# Patient Record
Sex: MALE | Race: WHITE | NOT HISPANIC OR LATINO | URBAN - METROPOLITAN AREA
[De-identification: names, ages, dates, MRNs, and addresses within clinical notes are randomized per-mention and may not be internally consistent; named-entity substitution may affect disease eponyms.]

---
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
## 2023-02-20 ENCOUNTER — APPOINTMENT (RX ONLY)
Dept: URBAN - METROPOLITAN AREA CLINIC 23 | Facility: CLINIC | Age: 18
Setting detail: DERMATOLOGY
End: 2023-02-20

## 2023-02-20 DIAGNOSIS — D22 MELANOCYTIC NEVI: ICD-10-CM

## 2023-02-20 DIAGNOSIS — L21.8 OTHER SEBORRHEIC DERMATITIS: ICD-10-CM | Status: INADEQUATELY CONTROLLED

## 2023-02-20 PROBLEM — D22.39 MELANOCYTIC NEVI OF OTHER PARTS OF FACE: Status: ACTIVE | Noted: 2023-02-20

## 2023-02-20 PROCEDURE — ? PRESCRIPTION MEDICATION MANAGEMENT

## 2023-02-20 PROCEDURE — ? COUNSELING

## 2023-02-20 PROCEDURE — 99204 OFFICE O/P NEW MOD 45 MIN: CPT

## 2023-02-20 PROCEDURE — ? PRESCRIPTION

## 2023-02-20 RX ORDER — KETOCONAZOLE 20 MG/G
1 CREAM TOPICAL
Qty: 60 | Refills: 3 | Status: ERX | COMMUNITY
Start: 2023-02-20

## 2023-02-20 RX ORDER — CLOBETASOL PROPIONATE 0.5 MG/ML
1 SOLUTION TOPICAL QHS
Qty: 50 | Refills: 2 | Status: ERX | COMMUNITY
Start: 2023-02-20

## 2023-02-20 RX ORDER — KETOCONAZOLE 20 MG/ML
SHAMPOO, SUSPENSION TOPICAL
Qty: 120 | Refills: 5 | Status: ERX | COMMUNITY
Start: 2023-02-20

## 2023-02-20 RX ADMIN — KETOCONAZOLE 1: 20 CREAM TOPICAL at 00:00

## 2023-02-20 RX ADMIN — KETOCONAZOLE: 20 SHAMPOO, SUSPENSION TOPICAL at 00:00

## 2023-02-20 RX ADMIN — CLOBETASOL PROPIONATE 1: 0.5 SOLUTION TOPICAL at 00:00

## 2023-02-20 ASSESSMENT — LOCATION SIMPLE DESCRIPTION DERM
LOCATION SIMPLE: SCALP
LOCATION SIMPLE: RIGHT SUBMANDIBULAR AREA

## 2023-02-20 ASSESSMENT — LOCATION ZONE DERM
LOCATION ZONE: SCALP
LOCATION ZONE: FACE

## 2023-02-20 ASSESSMENT — LOCATION DETAILED DESCRIPTION DERM
LOCATION DETAILED: RIGHT SUPERIOR PARIETAL SCALP
LOCATION DETAILED: RIGHT SUBMANDIBULAR AREA

## 2023-02-20 NOTE — HPI: RASH
What Type Of Note Output Would You Prefer (Optional)?: Standard Output
Is The Patient Presenting As Previously Scheduled?: Yes
Is This A New Presentation, Or A Follow-Up?: Rash
Additional History: Pt has tried OTC dandruff shampoos without improvement.

## 2023-02-20 NOTE — PROCEDURE: PRESCRIPTION MEDICATION MANAGEMENT
Render In Strict Bullet Format?: No
Initiate Treatment: ketoconazole 2 % shampoo: Wash scalp 4-5 times weekly. Leave on for 5 minutes then rinse off. Once clear, can use 1-2 times weekly for maintenance.\\n\\nclobetasol 0.05 % scalp solution: Apply to scalp QHS x 1-2 weeks until clear. May repeat PRN flares.\\n\\nketoconazole 2 % topical cream: Apply twice daily to affected areas behind ears until clear, then d/c. Restart bid prn flares
Detail Level: Zone

## 2023-07-14 ENCOUNTER — OFFICE VISIT (OUTPATIENT)
Dept: PRIMARY CARE | Facility: CLINIC | Age: 18
End: 2023-07-14
Payer: COMMERCIAL

## 2023-07-14 VITALS
RESPIRATION RATE: 15 BRPM | BODY MASS INDEX: 20.02 KG/M2 | SYSTOLIC BLOOD PRESSURE: 124 MMHG | DIASTOLIC BLOOD PRESSURE: 74 MMHG | HEART RATE: 85 BPM | WEIGHT: 143 LBS | TEMPERATURE: 97.7 F | OXYGEN SATURATION: 98 % | HEIGHT: 71 IN

## 2023-07-14 DIAGNOSIS — Z00.00 ROUTINE GENERAL MEDICAL EXAMINATION AT A HEALTH CARE FACILITY: Primary | ICD-10-CM

## 2023-07-14 DIAGNOSIS — Z13.220 LIPID SCREENING: ICD-10-CM

## 2023-07-14 PROCEDURE — 3008F BODY MASS INDEX DOCD: CPT | Performed by: STUDENT IN AN ORGANIZED HEALTH CARE EDUCATION/TRAINING PROGRAM

## 2023-07-14 PROCEDURE — 99395 PREV VISIT EST AGE 18-39: CPT | Performed by: STUDENT IN AN ORGANIZED HEALTH CARE EDUCATION/TRAINING PROGRAM

## 2023-07-14 ASSESSMENT — PATIENT HEALTH QUESTIONNAIRE - PHQ9: SUM OF ALL RESPONSES TO PHQ9 QUESTIONS 1 & 2: 0

## 2023-07-14 ASSESSMENT — PAIN SCALES - GENERAL: PAINLEVEL: 0-NO PAIN

## 2023-07-14 NOTE — ASSESSMENT & PLAN NOTE
Patient is up-to-date with age-appropriate cancer screenings and vaccinations unless otherwise noted above.  Counseled regarding importance of keeping up-to-date with vaccinations and cancer screenings as applicable.  Baseline/repeat/annual labs ordered.  RTO 1 year or sooner PRN.

## 2023-07-14 NOTE — PROGRESS NOTES
"     [facility]     CHIEF COMPLAINT   Annual Physical Exam     HISTORY OF PRESENT ILLNESS      This is a 17 y.o. male who presents for an annual physical exam.  Patient states since last visit he has lost approximately 40 pounds through caloric restriction and exercise intentionally.  Denies complaints.    PAST MEDICAL AND SURGICAL HISTORY      History reviewed. No pertinent past medical history.    History reviewed. No pertinent surgical history.    MEDICATIONS      No current outpatient medications on file.    ALLERGIES      Patient has no known allergies.    FAMILY HISTORY      Family History   Problem Relation Age of Onset   • No Known Problems Biological Mother    • No Known Problems Biological Father         Hip replacement       SOCIAL HISTORY      Social History     Socioeconomic History   • Marital status: Single     Spouse name: None   • Number of children: None   • Years of education: None   • Highest education level: None   Occupational History   • Occupation: Unemployed   Tobacco Use   • Smoking status: Never   • Smokeless tobacco: Never   Vaping Use   • Vaping Use: Never used   Substance and Sexual Activity   • Alcohol use: Never   • Drug use: Never   • Sexual activity: Defer       REVIEW OF SYSTEMS      Review of Systems   All other systems reviewed and are negative.      PHYSICAL EXAMINATION      Visit Vitals  /74   Pulse 85   Temp 36.5 °C (97.7 °F) (Temporal)   Resp 15   Ht 1.803 m (5' 11\")   Wt 64.9 kg (143 lb) Comment: with sneakers on   SpO2 98%   BMI 19.94 kg/m²     Body mass index is 19.94 kg/m².    Physical Exam  Constitutional:       General: He is not in acute distress.     Appearance: He is well-developed. He is not toxic-appearing or diaphoretic.   HENT:      Head: Normocephalic and atraumatic.   Eyes:      Extraocular Movements: Extraocular movements intact.      Pupils: Pupils are equal, round, and reactive to light.   Cardiovascular:      Rate and Rhythm: Normal " rate and regular rhythm.      Heart sounds: Normal heart sounds. No murmur heard.     No friction rub. No gallop.   Pulmonary:      Effort: Pulmonary effort is normal. No respiratory distress.      Breath sounds: Normal breath sounds. No wheezing or rales.   Musculoskeletal:         General: No deformity. Normal range of motion.      Cervical back: Normal range of motion and neck supple.      Right lower leg: No edema.      Left lower leg: No edema.   Skin:     General: Skin is warm and dry.      Coloration: Skin is not jaundiced.      Findings: No rash.   Neurological:      General: No focal deficit present.      Mental Status: He is alert and oriented to person, place, and time.   Psychiatric:         Mood and Affect: Mood normal.         Behavior: Behavior normal.         LABS / IMAGING / STUDIES        Labs    No results found for: CREATININE  No results found for: WBC, HGB, HCT, MCV, PLT      No results found for: HGBA1C  No results found for: RACHEAL [id number]        HEALTH MAINTENANCE           Tdap: Believes up-to-date  COVID-19: Up-to-date    Diet: Balanced  Exercise: Regular       ASSESSMENT AND PLAN   Problem List Items Addressed This Visit        Other    Routine general medical examination at a health care facility - Primary     Patient is up-to-date with age-appropriate cancer screenings and vaccinations unless otherwise noted above.  Counseled regarding importance of keeping up-to-date with vaccinations and cancer screenings as applicable.  Baseline/repeat/annual labs ordered.  RTO 1 year or sooner PRN.           Relevant Orders    CBC and differential    Comprehensive metabolic panel    Lipid panel   Other Visit Diagnoses     Lipid screening        Relevant Orders    Lipid panel          Health Care Maintenance (discussed if applicable):    Patient encouraged to increase activity gradually as tolerated with a goal of 150 minutes of aerobic activity per week via AVS.     Counseled patient on  healthy eating (high quality, low carb, low sugar diet) via AVS.     Advised patient to complete regular dental examinations, brushing and flossing daily via AVS.    Encouraged enhanced safety by wearing seat belts, checking smoke and CO detectors via AVS.            Clifton Ayala, DO  07/14/23        Some or all of this note has been written using voice recognition software.    Please excuse any typographical errors.    On date of encounter, 30 minutes were spent exclusively dedicated to this encounter including pre-visit chart review and documentation, patient interview and physical examination, and post-visit documentation.

## 2023-07-14 NOTE — PATIENT INSTRUCTIONS
After-Visit Instructions and Tips for General Wellbeing      -Let us know over our Patient Portal ([employer]) if you have any further questions or concerns.         At your visit today, we determined the time for you to come back for your next visit.  This was based off of your medical history, any medical problems/symptoms discussed at time of visit, and any testing ordered.         If at any point between now and your next visit you to have any questions or concerns, please feel free to reach out to me via our Patient Portal, [employer].  [employer] is free to use and I typically get back to you same-day, Monday through Friday.  Sometimes we are able to handle small questions or concerns over [employer], however if we cannot safely do so I will recommend that you come in for an appointment or get set up with a telemedicine appointment.      -Don't forget to get your blood work done.         Blood work is typically performed either through [facility], [facility], or [facility] laboratories.  This is dependent on your insurance, and during your visit we have discussed which lab is covered for you.  It will additionally state what lab you can go to on the hard copy of the lab slip that was provided to you after your visit.            Unless stated otherwise, these labs are FASTING.  You should not have anything to eat or drink besides water or black coffee (no cream or sugar) for at least 8 hours prior to getting your blood drawn.  Because of this, I usually recommend getting your blood work done first thing in the morning, as you have been fasting overnight.  Many people have the same idea however and these labs can get quite busy in the morning.         I recommend going online and setting up an appointment with the lab for an early morning just so that specific time spot is guaranteed for you and you are not waiting for an excess amount of time.  Make sure to bring the hard copy of your lab slip with you just in case they  cannot find you in their computer system.      As soon as I get all of your blood work results back, I will message you over [employer] or call you if the lab results are more pressing.      -Don't forget to get your Preventive Care screenings done.         If you were given any prescriptions for preventive care testing (such as mammograms, colonoscopies, Pap smears, or bone density testing), do not forget to get these done as well.  At your visit we discussed how to go about scheduling these, however if you still have questions you can reach out to me via [employer].         Additionally, all patients should be seen by their dentist for a routine dental cleaning at least once every 6 months.  Sometimes, dentists will bring you back sooner if they have to do more intensive cleaning.  Outside of the dentist, you should make sure you are brushing your teeth and flossing regularly.      -Dietary Tips         Every patient has individual dietary needs, however in general I recommend being cognizant of and limiting your intake of the following for food groups:    Salt/Sodium:       Salt can raise your blood pressure, which in turn can lead to increased risk of developing heart disease or strokes.  Please make sure you limit your use of sodium in cooking, and if you are buying a premade food item that has nutrition facts on the box/bag, please check for sodium content.  Many snacks such as chips or pretzels have a large amount of sodium in them.  Please do not exceed the daily recommended value of your sodium intake.    Sugars and Carbs:       Sugars (such as found in cake, candy, cookies, ice cream, etc.) and carbohydrates (such as found in bread, rice, pasta, starchy veggies such as potatoes, etc.) are both broken down your body into glucose.  Glucose is blood sugar, and excessive blood sugar exposure over the course of years to decades can put you at risk for developing diabetes.  Please make sure that you are limiting both  of these food groups in your diet, especially if you have a history of high blood sugar or strong family history of diabetes.    Fats:       Fatty foods such as fried/greasy foods, excess cooking oils, full fat dairy (whole milk, butter, cheese), and red meat all tend to be very fatty foods.  Fatty foods contain cholesterol, which can lead to heart disease in excessive amounts.  Please make sure you are limiting these foods in your diet.         Remember: The biggest thing on your plate should be responsibly cooked (sautéed in limited amounts of oil, air fried, or baked), responsibly seasoned (limited salt use), green vegetables (not potatoes).      -Exercise Tips         People exercise for multiple different reasons as each patient's exercise needs are unique.  In general, I recommend following the American Heart Association guidelines for heart healthy exercise.        The American Heart Association recommends doing 150 minutes (2.5 hours) of aerobic exercise per week.  You can break this up over the week however you see fit.  This exercise should be more strenuous than something you do on a daily basis, as such a light walk or counting your regular job as exercise do not count.  While these may be good from a calorie burning perspective, these are something your heart does on a daily basis and as such does not count as heart specific exercise.         Heart healthy exercise does not need to be particularly intensive.  Typically, a brisk walk up a hill (or on a treadmill that can incline) is more than enough.  The exercise should not be so intense that she could not focus on a podcast, TV show, or conversation during it.         If you have access to heart rate sensor such as through a smart watch, fitness tracker, or gym equipment that has this capability, a heart rate of about 140 is where most of my patients feel that they are able to maintain this exercise but do still derive cardiac benefit.      -Safety  Tips         Please make sure that you have both smoke detectors as well as carbon monoxide detectors in your home.  Additionally, please make sure that the batteries in these are up-to-date.  I recommend replacing batteries in both of these detectors every 6 months to ensure that the batteries do not run out and the smoke detectors/carbon monoxide detectors remain functional.         Additionally, please wear your seatbelt at all times when either operating a motor vehicle or as a passenger.  Regardless of location in the car you are seated, presence of airbags, or skill of the  of the vehicle, seatbelts are both required by law and greatly improve your chances of walking away from a car accident unscathed and should be worn at all times while the car is in motion.

## 2023-08-11 LAB
ALBUMIN SERPL-MCNC: 4.7 G/DL (ref 4.3–5.2)
ALBUMIN/GLOB SERPL: 1.7 {RATIO} (ref 1.2–2.2)
ALP SERPL-CCNC: 100 IU/L (ref 51–125)
ALT SERPL-CCNC: 16 IU/L (ref 0–44)
AST SERPL-CCNC: 14 IU/L (ref 0–40)
BASOPHILS # BLD AUTO: 0.1 X10E3/UL (ref 0–0.2)
BASOPHILS NFR BLD AUTO: 1 %
BILIRUB SERPL-MCNC: 0.3 MG/DL (ref 0–1.2)
BUN SERPL-MCNC: 21 MG/DL (ref 6–20)
BUN/CREAT SERPL: 22 (ref 9–20)
CALCIUM SERPL-MCNC: 9.8 MG/DL (ref 8.7–10.2)
CHLORIDE SERPL-SCNC: 105 MMOL/L (ref 96–106)
CHOLEST SERPL-MCNC: 131 MG/DL (ref 100–169)
CO2 SERPL-SCNC: 24 MMOL/L (ref 20–29)
CREAT SERPL-MCNC: 0.97 MG/DL (ref 0.76–1.27)
EGFRCR SERPLBLD CKD-EPI 2021: 115 ML/MIN/1.73
EOSINOPHIL # BLD AUTO: 0.1 X10E3/UL (ref 0–0.4)
EOSINOPHIL NFR BLD AUTO: 2 %
ERYTHROCYTE [DISTWIDTH] IN BLOOD BY AUTOMATED COUNT: 11.8 % (ref 11.6–15.4)
GLOBULIN SER CALC-MCNC: 2.7 G/DL (ref 1.5–4.5)
GLUCOSE SERPL-MCNC: 88 MG/DL (ref 70–99)
HCT VFR BLD AUTO: 43.9 % (ref 37.5–51)
HDLC SERPL-MCNC: 42 MG/DL
HGB BLD-MCNC: 15 G/DL (ref 13–17.7)
IMM GRANULOCYTES # BLD AUTO: 0 X10E3/UL (ref 0–0.1)
IMM GRANULOCYTES NFR BLD AUTO: 0 %
LDLC SERPL CALC-MCNC: 72 MG/DL (ref 0–109)
LYMPHOCYTES # BLD AUTO: 2.9 X10E3/UL (ref 0.7–3.1)
LYMPHOCYTES NFR BLD AUTO: 52 %
MCH RBC QN AUTO: 30.9 PG (ref 26.6–33)
MCHC RBC AUTO-ENTMCNC: 34.2 G/DL (ref 31.5–35.7)
MCV RBC AUTO: 90 FL (ref 79–97)
MONOCYTES # BLD AUTO: 0.5 X10E3/UL (ref 0.1–0.9)
MONOCYTES NFR BLD AUTO: 8 %
NEUTROPHILS # BLD AUTO: 2.1 X10E3/UL (ref 1.4–7)
NEUTROPHILS NFR BLD AUTO: 37 %
PLATELET # BLD AUTO: 183 X10E3/UL (ref 150–450)
POTASSIUM SERPL-SCNC: 4.5 MMOL/L (ref 3.5–5.2)
PROT SERPL-MCNC: 7.4 G/DL (ref 6–8.5)
RBC # BLD AUTO: 4.86 X10E6/UL (ref 4.14–5.8)
SODIUM SERPL-SCNC: 142 MMOL/L (ref 134–144)
TRIGL SERPL-MCNC: 91 MG/DL (ref 0–89)
VLDLC SERPL CALC-MCNC: 17 MG/DL (ref 5–40)
WBC # BLD AUTO: 5.7 X10E3/UL (ref 3.4–10.8)

## 2024-05-06 ENCOUNTER — APPOINTMENT (RX ONLY)
Dept: URBAN - METROPOLITAN AREA CLINIC 28 | Facility: CLINIC | Age: 19
Setting detail: DERMATOLOGY
End: 2024-05-06

## 2024-05-06 DIAGNOSIS — Q819 OTHER SPECIFIED ANOMALIES OF SKIN: ICD-10-CM

## 2024-05-06 DIAGNOSIS — D22 MELANOCYTIC NEVI: ICD-10-CM

## 2024-05-06 DIAGNOSIS — D18.0 HEMANGIOMA: ICD-10-CM

## 2024-05-06 DIAGNOSIS — Z12.83 ENCOUNTER FOR SCREENING FOR MALIGNANT NEOPLASM OF SKIN: ICD-10-CM

## 2024-05-06 DIAGNOSIS — L72.8 OTHER FOLLICULAR CYSTS OF THE SKIN AND SUBCUTANEOUS TISSUE: ICD-10-CM

## 2024-05-06 DIAGNOSIS — L57.8 OTHER SKIN CHANGES DUE TO CHRONIC EXPOSURE TO NONIONIZING RADIATION: ICD-10-CM

## 2024-05-06 DIAGNOSIS — Z71.89 OTHER SPECIFIED COUNSELING: ICD-10-CM

## 2024-05-06 DIAGNOSIS — Q826 OTHER SPECIFIED ANOMALIES OF SKIN: ICD-10-CM

## 2024-05-06 DIAGNOSIS — L82.1 OTHER SEBORRHEIC KERATOSIS: ICD-10-CM

## 2024-05-06 DIAGNOSIS — Q828 OTHER SPECIFIED ANOMALIES OF SKIN: ICD-10-CM

## 2024-05-06 DIAGNOSIS — L85.3 XEROSIS CUTIS: ICD-10-CM

## 2024-05-06 DIAGNOSIS — L81.4 OTHER MELANIN HYPERPIGMENTATION: ICD-10-CM

## 2024-05-06 PROBLEM — D22.5 MELANOCYTIC NEVI OF TRUNK: Status: ACTIVE | Noted: 2024-05-06

## 2024-05-06 PROBLEM — L85.8 OTHER SPECIFIED EPIDERMAL THICKENING: Status: ACTIVE | Noted: 2024-05-06

## 2024-05-06 PROBLEM — D18.01 HEMANGIOMA OF SKIN AND SUBCUTANEOUS TISSUE: Status: ACTIVE | Noted: 2024-05-06

## 2024-05-06 PROCEDURE — ? COUNSELING

## 2024-05-06 PROCEDURE — 99213 OFFICE O/P EST LOW 20 MIN: CPT

## 2024-05-06 PROCEDURE — ? PRESCRIPTION MEDICATION MANAGEMENT

## 2024-05-06 PROCEDURE — ? PRESCRIPTION

## 2024-05-06 RX ORDER — AMMONIUM LACTATE 12 G/100G
LOTION TOPICAL QDAY
Qty: 400 | Refills: 6 | Status: ERX | COMMUNITY
Start: 2024-05-06

## 2024-05-06 RX ORDER — CLINDAMYCIN PHOSPHATE 10 MG/ML
LOTION TOPICAL BID
Qty: 60 | Refills: 3 | Status: ERX | COMMUNITY
Start: 2024-05-06

## 2024-05-06 RX ORDER — TRIAMCINOLONE ACETONIDE 0.15 MG/G
AEROSOL, SPRAY TOPICAL
Qty: 63 | Refills: 2 | Status: ERX | COMMUNITY
Start: 2024-05-06

## 2024-05-06 RX ADMIN — TRIAMCINOLONE ACETONIDE: 0.15 AEROSOL, SPRAY TOPICAL at 00:00

## 2024-05-06 RX ADMIN — CLINDAMYCIN PHOSPHATE: 10 LOTION TOPICAL at 00:00

## 2024-05-06 RX ADMIN — AMMONIUM LACTATE: 12 LOTION TOPICAL at 00:00

## 2024-05-06 ASSESSMENT — LOCATION DETAILED DESCRIPTION DERM
LOCATION DETAILED: LEFT ANTERIOR SHOULDER
LOCATION DETAILED: STERNAL NOTCH
LOCATION DETAILED: RIGHT MEDIAL UPPER BACK
LOCATION DETAILED: RIGHT ANTERIOR SHOULDER
LOCATION DETAILED: LEFT CLAVICULAR NECK
LOCATION DETAILED: LEFT PROXIMAL POSTERIOR UPPER ARM
LOCATION DETAILED: EPIGASTRIC SKIN
LOCATION DETAILED: RIGHT CLAVICULAR NECK
LOCATION DETAILED: RIGHT MEDIAL SUPERIOR CHEST
LOCATION DETAILED: LEFT INFERIOR CENTRAL MALAR CHEEK
LOCATION DETAILED: RIGHT PROXIMAL POSTERIOR UPPER ARM

## 2024-05-06 ASSESSMENT — LOCATION SIMPLE DESCRIPTION DERM
LOCATION SIMPLE: RIGHT UPPER BACK
LOCATION SIMPLE: LEFT CHEEK
LOCATION SIMPLE: RIGHT UPPER ARM
LOCATION SIMPLE: LEFT UPPER ARM
LOCATION SIMPLE: LEFT SHOULDER
LOCATION SIMPLE: LEFT ANTERIOR NECK
LOCATION SIMPLE: RIGHT SHOULDER
LOCATION SIMPLE: RIGHT ANTERIOR NECK
LOCATION SIMPLE: ABDOMEN
LOCATION SIMPLE: CHEST

## 2024-05-06 ASSESSMENT — LOCATION ZONE DERM
LOCATION ZONE: TRUNK
LOCATION ZONE: ARM
LOCATION ZONE: FACE
LOCATION ZONE: NECK

## 2024-05-06 NOTE — PROCEDURE: PRESCRIPTION MEDICATION MANAGEMENT
Render In Strict Bullet Format?: No
Detail Level: Zone
Initiate Treatment: ammonium lactate 12 % lotion: Apply a thin layer to AA of arms Qday - BID
Detail Level: Simple
Initiate Treatment: clindamycin 1 % lotion: Apply to spot on face BID

## 2024-05-06 NOTE — PROCEDURE: MIPS QUALITY
Quality 431: Preventive Care And Screening: Unhealthy Alcohol Use - Screening: Patient not identified as an unhealthy alcohol user when screened for unhealthy alcohol use using a systematic screening method
Detail Level: Detailed
Quality 226: Preventive Care And Screening: Tobacco Use: Screening And Cessation Intervention: Tobacco Screening not Performed
Quality 130: Documentation Of Current Medications In The Medical Record: Current Medications with Name, Dosage, Frequency, or Route not Documented, Reason not Given

## 2024-07-22 SDOH — ECONOMIC STABILITY: FOOD INSECURITY: WITHIN THE PAST 12 MONTHS, YOU WORRIED THAT YOUR FOOD WOULD RUN OUT BEFORE YOU GOT MONEY TO BUY MORE.: NEVER TRUE

## 2024-07-22 SDOH — ECONOMIC STABILITY: FOOD INSECURITY: WITHIN THE PAST 12 MONTHS, THE FOOD YOU BOUGHT JUST DIDN'T LAST AND YOU DIDN'T HAVE MONEY TO GET MORE.: NEVER TRUE

## 2024-07-22 SDOH — ECONOMIC STABILITY: INCOME INSECURITY: IN THE LAST 12 MONTHS, WAS THERE A TIME WHEN YOU WERE NOT ABLE TO PAY THE MORTGAGE OR RENT ON TIME?: NO

## 2024-07-22 SDOH — ECONOMIC STABILITY: TRANSPORTATION INSECURITY
IN THE PAST 12 MONTHS, HAS THE LACK OF TRANSPORTATION KEPT YOU FROM MEDICAL APPOINTMENTS OR FROM GETTING MEDICATIONS?: NO

## 2024-07-22 SDOH — ECONOMIC STABILITY: TRANSPORTATION INSECURITY
IN THE PAST 12 MONTHS, HAS LACK OF TRANSPORTATION KEPT YOU FROM MEETINGS, WORK, OR FROM GETTING THINGS NEEDED FOR DAILY LIVING?: NO

## 2024-07-22 ASSESSMENT — SOCIAL DETERMINANTS OF HEALTH (SDOH): IN THE PAST 12 MONTHS, HAS THE ELECTRIC, GAS, OIL, OR WATER COMPANY THREATENED TO SHUT OFF SERVICE IN YOUR HOME?: NO

## 2024-07-23 ENCOUNTER — OFFICE VISIT (OUTPATIENT)
Dept: PRIMARY CARE | Facility: CLINIC | Age: 19
End: 2024-07-23
Payer: COMMERCIAL

## 2024-07-23 VITALS
RESPIRATION RATE: 16 BRPM | WEIGHT: 157 LBS | HEIGHT: 70 IN | BODY MASS INDEX: 22.48 KG/M2 | DIASTOLIC BLOOD PRESSURE: 80 MMHG | TEMPERATURE: 98 F | OXYGEN SATURATION: 97 % | SYSTOLIC BLOOD PRESSURE: 120 MMHG | HEART RATE: 92 BPM

## 2024-07-23 DIAGNOSIS — Z13.220 LIPID SCREENING: ICD-10-CM

## 2024-07-23 DIAGNOSIS — Z00.00 ROUTINE GENERAL MEDICAL EXAMINATION AT A HEALTH CARE FACILITY: Primary | ICD-10-CM

## 2024-07-23 PROCEDURE — 99395 PREV VISIT EST AGE 18-39: CPT | Performed by: STUDENT IN AN ORGANIZED HEALTH CARE EDUCATION/TRAINING PROGRAM

## 2024-07-23 PROCEDURE — 3008F BODY MASS INDEX DOCD: CPT | Performed by: STUDENT IN AN ORGANIZED HEALTH CARE EDUCATION/TRAINING PROGRAM

## 2024-07-23 ASSESSMENT — PAIN SCALES - GENERAL: PAINLEVEL: 0-NO PAIN

## 2024-07-23 ASSESSMENT — PATIENT HEALTH QUESTIONNAIRE - PHQ9: SUM OF ALL RESPONSES TO PHQ9 QUESTIONS 1 & 2: 0

## 2024-07-23 NOTE — PATIENT INSTRUCTIONS
After-Visit Instructions and Tips for General Wellbeing      -Let us know over our Patient Portal ([facility]) if you have any further questions or concerns.         At your visit today, we determined the time for you to come back for your next visit.  This was based off of your medical history, any medical problems/symptoms discussed at time of visit, and any testing ordered.         If at any point between now and your next visit you have any questions or concerns, please feel free to reach out to me via our Patient Portal, [facility].  [facility] is free to use and I typically get back to you same-day, Monday through Friday.  Sometimes we are able to handle small questions or concerns over [facility], however if we cannot safely do so I will recommend that you come in for an appointment or get set up with a telemedicine appointment.      -Don't forget to get your blood work done.         Blood work is typically performed either through [facility], [facility], or [facility] laboratories.  This is dependent on your insurance, and during your visit we have discussed which lab is covered for you.  It will additionally state what lab you can go to on the hard copy of the lab slip that was provided to you after your visit.            Unless stated otherwise, these labs are FASTING.  You should not have anything to eat or drink besides water or black coffee (no cream or sugar) for at least 8 hours prior to getting your blood drawn.  Because of this, I usually recommend getting your blood work done first thing in the morning, as you have been fasting overnight.  Many people have the same idea however and these labs can get quite busy in the morning.         I recommend going online and setting up an appointment with the lab for an early morning just so that specific time spot is guaranteed for you and you are not waiting for an excess amount of time.  Make sure to bring the hard copy of your lab slip with you just in case they cannot  find you in their computer system.      As soon as I get all of your blood work results back, I will message you over [employer] or call you if the lab results are more pressing.      -Don't forget to get your Preventive Care screenings done.         If you were given any prescriptions for preventive care testing (such as mammograms, colonoscopies, Pap smears, or bone density testing), do not forget to get these done as well.  At your visit we discussed how to go about scheduling these, however if you still have questions you can reach out to me via [employer].         Additionally, all patients should be seen by their dentist for a routine dental cleaning at least once every 6 months.  Sometimes, dentists will bring you back sooner if they have to do more intensive cleaning.  Outside of the dentist, you should make sure you are brushing your teeth and flossing regularly.      -Dietary Tips         Every patient has individual dietary needs, however in general I recommend being cognizant of and limiting your intake of the following for food groups:    Salt/Sodium:       Salt can raise your blood pressure, which in turn can lead to increased risk of developing heart disease or strokes.  Please make sure you limit your use of sodium in cooking, and if you are buying a premade food item that has nutrition facts on the box/bag, please check for sodium content.  Many snacks such as chips or pretzels have a large amount of sodium in them.  Please do not exceed the daily recommended value of your sodium intake.    Sugars and Carbs:       Sugars (such as found in cake, candy, cookies, ice cream, etc.) and carbohydrates (such as found in bread, rice, pasta, starchy veggies such as potatoes, etc.) are both broken down your body into glucose.  Glucose is blood sugar, and excessive blood sugar exposure over the course of years to decades can put you at risk for developing diabetes.  Please make sure that you are limiting both of  these food groups in your diet, especially if you have a history of high blood sugar or strong family history of diabetes.    Fats:       Fatty foods such as fried/greasy foods, excess cooking oils, full fat dairy (whole milk, butter, cheese), and red meat all tend to be very fatty foods.  Fatty foods contain cholesterol, which can lead to heart disease in excessive amounts.  Please make sure you are limiting these foods in your diet.         Remember: The biggest thing on your plate should be responsibly cooked (sautéed in limited amounts of oil, air fried, or baked), responsibly seasoned (limited salt use), green vegetables (not potatoes).      -Exercise Tips         People exercise for multiple different reasons as each patient's exercise needs are unique.  In general, I recommend following the American Heart Association guidelines for heart healthy exercise.        The American Heart Association recommends doing 150 minutes (2.5 hours) of aerobic exercise per week.  You can break this up over the week however you see fit.  This exercise should be more strenuous than something you do on a daily basis, as such a light walk or counting your regular job as exercise do not count.  While these may be good from a calorie burning perspective, these are something your heart does on a daily basis and as such does not count as heart specific exercise.         Heart healthy exercise does not need to be particularly intensive.  Typically, a brisk walk up a hill (or on a treadmill that can incline) is more than enough.  The exercise should not be so intense that she could not focus on a podcast, TV show, or conversation during it.         If you have access to heart rate sensor such as through a smart watch, fitness tracker, or gym equipment that has this capability, a heart rate of about 140 is where most of my patients feel that they are able to maintain this exercise but do still derive cardiac benefit.      -Safety  Tips         Please make sure that you have both smoke detectors as well as carbon monoxide detectors in your home.  Additionally, please make sure that the batteries in these are up-to-date.  I recommend replacing batteries in both of these detectors every 6 months to ensure that the batteries do not run out and the smoke detectors/carbon monoxide detectors remain functional.         Additionally, please wear your seatbelt at all times when either operating a motor vehicle or as a passenger.  Regardless of location in the car you are seated, presence of airbags, or skill of the  of the vehicle, seatbelts are both required by law and greatly improve your chances of walking away from a car accident unscathed and should be worn at all times while the car is in motion.

## 2024-07-23 NOTE — PROGRESS NOTES
"     [facility]     CHIEF COMPLAINT   Annual Physical Exam     HISTORY OF PRESENT ILLNESS      This is a 18 y.o. male who presents for an annual physical exam.  Denies concerns today.    PAST MEDICAL AND SURGICAL HISTORY      History reviewed. No pertinent past medical history.    History reviewed. No pertinent surgical history.    MEDICATIONS      No current outpatient medications on file.    ALLERGIES      Patient has no known allergies.    FAMILY HISTORY      Family History   Problem Relation Age of Onset    No Known Problems Biological Mother     Atrial fibrillation Biological Father        SOCIAL HISTORY      Social History     Socioeconomic History    Marital status: Single     Spouse name: None    Number of children: None    Years of education: None    Highest education level: None   Occupational History    Occupation: Internship   Tobacco Use    Smoking status: Never    Smokeless tobacco: Never   Vaping Use    Vaping Use: Never used   Substance and Sexual Activity    Alcohol use: Never    Drug use: Never    Sexual activity: Not Currently     Social Determinants of Health     Food Insecurity: No Food Insecurity (7/22/2024)    Hunger Vital Sign     Worried About Running Out of Food in the Last Year: Never true     Ran Out of Food in the Last Year: Never true   Transportation Needs: No Transportation Needs (7/22/2024)    PRAPARE - Transportation     Lack of Transportation (Medical): No     Lack of Transportation (Non-Medical): No   Housing Stability: Low Risk  (7/22/2024)    Housing Stability Vital Sign     Unable to Pay for Housing in the Last Year: No     Number of Places Lived in the Last Year: 1     Unstable Housing in the Last Year: No       REVIEW OF SYSTEMS      Review of Systems   All other systems reviewed and are negative.      PHYSICAL EXAMINATION      Visit Vitals  /80   Pulse 92   Temp 36.7 °C (98 °F) (Temporal)   Resp 16   Ht 1.778 m (5' 10\")   Wt 71.2 kg (157 lb) Comment: " "sneakers   SpO2 97%   BMI 22.53 kg/m²     Body mass index is 22.53 kg/m².    Physical Exam  Vitals reviewed.   Constitutional:       General: He is not in acute distress.     Appearance: He is well-developed. He is not toxic-appearing or diaphoretic.   HENT:      Head: Normocephalic and atraumatic.   Eyes:      Extraocular Movements: Extraocular movements intact.      Pupils: Pupils are equal, round, and reactive to light.   Cardiovascular:      Rate and Rhythm: Normal rate and regular rhythm.      Heart sounds: Normal heart sounds. No murmur heard.     No friction rub. No gallop.   Pulmonary:      Effort: Pulmonary effort is normal. No respiratory distress.      Breath sounds: Normal breath sounds. No wheezing or rales.   Musculoskeletal:         General: No deformity. Normal range of motion.      Cervical back: Normal range of motion and neck supple.      Right lower leg: No edema.      Left lower leg: No edema.   Skin:     General: Skin is warm and dry.      Coloration: Skin is not jaundiced.      Findings: No rash.   Neurological:      General: No focal deficit present.      Mental Status: He is alert and oriented to person, place, and time.   Psychiatric:         Mood and Affect: Mood normal.         Behavior: Behavior normal.         LABS / IMAGING / STUDIES        Labs    Lab Results   Component Value Date    CREATININE 0.97 08/10/2023     Lab Results   Component Value Date    WBC 5.7 08/10/2023    HGB 15.0 08/10/2023    HCT 43.9 08/10/2023    MCV 90 08/10/2023     08/10/2023         No results found for: \"HGBA1C\"  No results found for: \"MICROALBUR\", \"[id number]\"        HEALTH MAINTENANCE           Tdap: Believes UTD, can consider @ next visit    Dentist: [facility]    Diet: Balanced  Exercise: Walks for work, improving gym time when school starts @ [employer]       ASSESSMENT AND PLAN   Problem List Items Addressed This Visit          Other    Routine general medical examination at a health care facility - " Primary     Patient is up-to-date with age-appropriate cancer screenings and vaccinations unless otherwise noted above.  Counseled regarding importance of keeping up-to-date with vaccinations and cancer screenings as applicable.  Baseline/repeat/annual labs ordered.  RTO 1 year or sooner PRN.           Relevant Orders    CBC and differential    Comprehensive metabolic panel    Lipid panel    Hemoglobin A1c    TSH w reflex FT4     Other Visit Diagnoses       Lipid screening        Relevant Orders    Lipid panel            Health Care Maintenance (discussed if applicable):    Patient encouraged to increase activity gradually as tolerated with a goal of 150 minutes of aerobic activity per week via [facility].     Counseled patient on healthy eating (high quality, low carb, low sugar diet) via AVS.     Advised patient to complete regular dental examinations, brushing and flossing daily via [facility]S.    Encouraged enhanced safety by wearing seat belts, checking smoke and CO detectors via AVS.            Clifton Ayala, DO  07/23/24        Some or all of this note has been written using voice recognition software.    Please excuse any typographical errors.    On date of encounter, 30 minutes were spent exclusively dedicated to this encounter including pre-visit chart review and documentation, patient interview and physical examination, and post-visit documentation.

## 2025-03-21 ENCOUNTER — OFFICE VISIT (OUTPATIENT)
Dept: PRIMARY CARE | Facility: CLINIC | Age: 20
End: 2025-03-21
Payer: COMMERCIAL

## 2025-03-21 DIAGNOSIS — Z23 NEED FOR TDAP VACCINATION: Primary | ICD-10-CM

## 2025-03-21 PROCEDURE — 99999 PR OFFICE/OUTPT VISIT,PROCEDURE ONLY: CPT | Mod: 25 | Performed by: STUDENT IN AN ORGANIZED HEALTH CARE EDUCATION/TRAINING PROGRAM

## 2025-03-21 PROCEDURE — 90715 TDAP VACCINE 7 YRS/> IM: CPT | Performed by: STUDENT IN AN ORGANIZED HEALTH CARE EDUCATION/TRAINING PROGRAM

## 2025-03-21 PROCEDURE — 90471 IMMUNIZATION ADMIN: CPT | Performed by: STUDENT IN AN ORGANIZED HEALTH CARE EDUCATION/TRAINING PROGRAM
